# Patient Record
Sex: MALE | Race: WHITE | NOT HISPANIC OR LATINO
[De-identification: names, ages, dates, MRNs, and addresses within clinical notes are randomized per-mention and may not be internally consistent; named-entity substitution may affect disease eponyms.]

---

## 2017-11-29 ENCOUNTER — APPOINTMENT (OUTPATIENT)
Dept: COLORECTAL SURGERY | Facility: CLINIC | Age: 47
End: 2017-11-29
Payer: COMMERCIAL

## 2017-11-29 VITALS — HEIGHT: 78 IN | HEART RATE: 78 BPM | RESPIRATION RATE: 14 BRPM | WEIGHT: 262 LBS | BODY MASS INDEX: 30.31 KG/M2

## 2017-11-29 DIAGNOSIS — Z87.19 PERSONAL HISTORY OF OTHER DISEASES OF THE DIGESTIVE SYSTEM: ICD-10-CM

## 2017-11-29 DIAGNOSIS — K62.89 OTHER SPECIFIED DISEASES OF ANUS AND RECTUM: ICD-10-CM

## 2017-11-29 DIAGNOSIS — Z87.09 PERSONAL HISTORY OF OTHER DISEASES OF THE RESPIRATORY SYSTEM: ICD-10-CM

## 2017-11-29 DIAGNOSIS — L29.0 PRURITUS ANI: ICD-10-CM

## 2017-11-29 PROCEDURE — 46940 TREATMENT OF ANAL FISSURE: CPT

## 2017-11-29 PROCEDURE — 99214 OFFICE O/P EST MOD 30 MIN: CPT | Mod: 25

## 2017-12-05 PROBLEM — Z87.19 HISTORY OF RECTAL BLEEDING: Status: RESOLVED | Noted: 2017-12-05 | Resolved: 2017-12-05

## 2018-02-15 ENCOUNTER — APPOINTMENT (OUTPATIENT)
Dept: COLORECTAL SURGERY | Facility: CLINIC | Age: 48
End: 2018-02-15
Payer: COMMERCIAL

## 2018-02-15 ENCOUNTER — RESULT REVIEW (OUTPATIENT)
Age: 48
End: 2018-02-15

## 2018-02-15 PROCEDURE — 45380 COLONOSCOPY AND BIOPSY: CPT

## 2018-07-11 ENCOUNTER — APPOINTMENT (OUTPATIENT)
Dept: COLORECTAL SURGERY | Facility: CLINIC | Age: 48
End: 2018-07-11
Payer: COMMERCIAL

## 2018-07-11 VITALS
BODY MASS INDEX: 30.08 KG/M2 | HEART RATE: 71 BPM | RESPIRATION RATE: 14 BRPM | WEIGHT: 260 LBS | DIASTOLIC BLOOD PRESSURE: 86 MMHG | SYSTOLIC BLOOD PRESSURE: 123 MMHG | HEIGHT: 78 IN

## 2018-07-11 DIAGNOSIS — E78.5 HYPERLIPIDEMIA, UNSPECIFIED: ICD-10-CM

## 2018-07-11 PROCEDURE — 46942 TREATMENT OF ANAL FISSURE: CPT

## 2018-07-11 PROCEDURE — 99214 OFFICE O/P EST MOD 30 MIN: CPT | Mod: 25

## 2018-08-15 ENCOUNTER — APPOINTMENT (OUTPATIENT)
Dept: COLORECTAL SURGERY | Facility: CLINIC | Age: 48
End: 2018-08-15

## 2018-10-31 ENCOUNTER — APPOINTMENT (OUTPATIENT)
Dept: COLORECTAL SURGERY | Facility: CLINIC | Age: 48
End: 2018-10-31
Payer: COMMERCIAL

## 2018-10-31 VITALS
BODY MASS INDEX: 30.08 KG/M2 | SYSTOLIC BLOOD PRESSURE: 114 MMHG | DIASTOLIC BLOOD PRESSURE: 79 MMHG | WEIGHT: 260 LBS | HEIGHT: 78 IN | HEART RATE: 88 BPM

## 2018-10-31 DIAGNOSIS — Z86.39 PERSONAL HISTORY OF OTHER ENDOCRINE, NUTRITIONAL AND METABOLIC DISEASE: ICD-10-CM

## 2018-10-31 PROCEDURE — 46942 TREATMENT OF ANAL FISSURE: CPT

## 2018-10-31 PROCEDURE — 99214 OFFICE O/P EST MOD 30 MIN: CPT | Mod: 25

## 2018-10-31 RX ORDER — ROSUVASTATIN CALCIUM 5 MG/1
TABLET, FILM COATED ORAL
Refills: 0 | Status: ACTIVE | COMMUNITY

## 2019-02-08 ENCOUNTER — APPOINTMENT (OUTPATIENT)
Dept: COLORECTAL SURGERY | Facility: CLINIC | Age: 49
End: 2019-02-08

## 2020-02-05 ENCOUNTER — APPOINTMENT (OUTPATIENT)
Dept: COLORECTAL SURGERY | Facility: CLINIC | Age: 50
End: 2020-02-05
Payer: COMMERCIAL

## 2020-02-05 VITALS
SYSTOLIC BLOOD PRESSURE: 108 MMHG | HEIGHT: 78 IN | RESPIRATION RATE: 14 BRPM | BODY MASS INDEX: 30.66 KG/M2 | HEART RATE: 71 BPM | WEIGHT: 265 LBS | DIASTOLIC BLOOD PRESSURE: 76 MMHG

## 2020-02-05 DIAGNOSIS — Z86.010 PERSONAL HISTORY OF COLONIC POLYPS: ICD-10-CM

## 2020-02-05 PROCEDURE — 46942 TREATMENT OF ANAL FISSURE: CPT

## 2020-02-05 PROCEDURE — 99215 OFFICE O/P EST HI 40 MIN: CPT | Mod: 25

## 2020-02-05 NOTE — ASSESSMENT
[FreeTextEntry1] : 49-year-old male with anal fissure rectal bleeding And history of colon polyps. Recommend cauterization of anal fissure.Recommend high fiber diet, Metamucil daily, sitz baths, stool softeners, pain medications p.r.n. Recommend colonoscopy. Risks and benefits of colonoscopy have been discussed which include but not limited to bleeding, perforation, missing a cancer or polyp occurring 5%.\par

## 2020-02-05 NOTE — PROCEDURE
[FreeTextEntry1] : After informed consent was obtained anal fissure was cauterized with silver nitrate sphincter was dilated enough to be performed demonstrating hemorrhoids and fissure

## 2020-02-05 NOTE — REVIEW OF SYSTEMS
[Abdominal Pain] : no abdominal pain [Constipation] : constipation [Negative] : Heme/Lymph [FreeTextEntry7] : Anal pain, rectal bleeding

## 2020-02-05 NOTE — PHYSICAL EXAM
[Abdomen Masses] : No abdominal masses [Tender] : nontender [Normal rectal exam] : exam was normal [Excoriation] : no perianal excoriation [Reduce Spontaneously] : a spontaneously reducible (grade II) [Tender, Swollen] : tender, swollen [Thrombosed] : that was thrombosed [Normal] : was normal [Posterior] : posteriorly [None] : there was no rectal mass  [Normal Breath Sounds] : Normal breath sounds [Normal Rate and Rhythm] : normal rate and rhythm [Normal Heart Sounds] : normal heart sounds [No Rash or Lesion] : No rash or lesion [Alert] : alert [Oriented to Person] : oriented to person [Oriented to Place] : oriented to place [Oriented to Time] : oriented to time [Calm] : calm [de-identified] : Looks well in no distress, of stated age. [de-identified] : moves all 4 extremities appropriately with 5 over 5 strength [de-identified] : pupils equal reactive to light normocephalic atraumatic.

## 2020-06-26 ENCOUNTER — TRANSCRIPTION ENCOUNTER (OUTPATIENT)
Age: 50
End: 2020-06-26

## 2020-06-26 ENCOUNTER — OUTPATIENT (OUTPATIENT)
Dept: OUTPATIENT SERVICES | Facility: HOSPITAL | Age: 50
LOS: 1 days | End: 2020-06-26
Payer: COMMERCIAL

## 2020-06-26 ENCOUNTER — APPOINTMENT (OUTPATIENT)
Dept: RADIOLOGY | Facility: CLINIC | Age: 50
End: 2020-06-26
Payer: COMMERCIAL

## 2020-06-26 ENCOUNTER — APPOINTMENT (OUTPATIENT)
Dept: NEUROSURGERY | Facility: CLINIC | Age: 50
End: 2020-06-26
Payer: COMMERCIAL

## 2020-06-26 VITALS
HEART RATE: 81 BPM | TEMPERATURE: 97.5 F | BODY MASS INDEX: 30.66 KG/M2 | SYSTOLIC BLOOD PRESSURE: 115 MMHG | OXYGEN SATURATION: 96 % | DIASTOLIC BLOOD PRESSURE: 82 MMHG | HEIGHT: 78 IN | WEIGHT: 265 LBS

## 2020-06-26 DIAGNOSIS — Z00.8 ENCOUNTER FOR OTHER GENERAL EXAMINATION: ICD-10-CM

## 2020-06-26 DIAGNOSIS — M54.5 LOW BACK PAIN: ICD-10-CM

## 2020-06-26 DIAGNOSIS — M54.10 RADICULOPATHY, SITE UNSPECIFIED: ICD-10-CM

## 2020-06-26 DIAGNOSIS — R20.2 PARESTHESIA OF SKIN: ICD-10-CM

## 2020-06-26 PROCEDURE — 72110 X-RAY EXAM L-2 SPINE 4/>VWS: CPT

## 2020-06-26 PROCEDURE — 99202 OFFICE O/P NEW SF 15 MIN: CPT

## 2020-06-26 PROCEDURE — 72110 X-RAY EXAM L-2 SPINE 4/>VWS: CPT | Mod: 26

## 2020-06-26 RX ORDER — CYCLOBENZAPRINE HYDROCHLORIDE 5 MG/1
5 TABLET, FILM COATED ORAL 3 TIMES DAILY
Qty: 30 | Refills: 0 | Status: ACTIVE | COMMUNITY
Start: 2020-06-26 | End: 1900-01-01

## 2020-06-26 NOTE — CONSULT LETTER
[Dear  ___] : Dear  [unfilled], [Sincerely,] : Sincerely, [Courtesy Letter:] : I had the pleasure of seeing your patient, [unfilled], in my office today. [FreeTextEntry1] : Da Yo is a 49-year-old male, with a history of vascular insufficiency with ablations, who presents today with lumbar symptoms.  Patient states symptoms started approximately 1 week ago with no specific event.  He reports 6/10 constant dull ache lower back pain.  Bending forward produces sharp pain.  Initially, he was experiencing left side greater than right bilateral leg tightness.  This has progressed to a heaviness-like sensation and is most predominant in the left entire leg.  He denies any specific numbness or tingling.  He reports slight left leg weakness.  He denies any catching of his toes or walking difficulties.  He denies any bowel or bladder dysfunction.  Patient states standing, sitting, and bending over increased pain.  Lying down and extension of back provide him with some relief.  Patient takes Tylenol and Advil with some relief noted.  Heat provides him with some relief.\par \par There is no imaging to review with the patient. \par \par Patient is alert and oriented.  No distress noted.  Absent bilateral patellar reflexes noted.  Patient appears tense.  Present and equal bilateral Achilles tendon reflexes noted.  5/5 strength to bilateral legs.  Sensation to light touch to bilateral legs equal and normal.  Negative clonus.  Positive left leg straight test.  Patient is walking without difficulties.\par \par I have provided the patient with a prescription for Flexeril.  I reviewed side effects of the medication with the patient.  I provided him with a prescription for physical therapy.  I have also provided the patient with a prescription for an x-ray of the lumbar spine.  We will review Xray results over the phone. I have recommended FU with his vascular MD for the bilateral leg heaviness. We will follow-up approximately 6 weeks after initiating physical therapy.  Is aware to call with any new or worsening symptoms. [FreeTextEntry2] : Daniel Hargrove MD\par [address] [place] [address] Ave\[place] [address] [FreeTextEntry3] : Mira Lopez, MSN, FNP-BC\par Nurse Practitioner\par Neurosurgery\par [address] [facility], 2nd floor \[place] [address] \par Office: [phone] \par Fax: [phone]\par  \par tt

## 2020-08-13 DIAGNOSIS — Z01.818 ENCOUNTER FOR OTHER PREPROCEDURAL EXAMINATION: ICD-10-CM

## 2020-08-17 ENCOUNTER — APPOINTMENT (OUTPATIENT)
Dept: DISASTER EMERGENCY | Facility: CLINIC | Age: 50
End: 2020-08-17

## 2020-08-19 LAB — SARS-COV-2 N GENE NPH QL NAA+PROBE: NOT DETECTED

## 2020-08-20 ENCOUNTER — APPOINTMENT (OUTPATIENT)
Dept: COLORECTAL SURGERY | Facility: AMBULATORY MEDICAL SERVICES | Age: 50
End: 2020-08-20
Payer: COMMERCIAL

## 2020-08-20 ENCOUNTER — RESULT REVIEW (OUTPATIENT)
Age: 50
End: 2020-08-20

## 2020-08-20 PROCEDURE — 45380 COLONOSCOPY AND BIOPSY: CPT

## 2020-10-01 PROBLEM — M54.10 BILATERAL RADIATING LEG PAIN: Status: ACTIVE | Noted: 2020-06-26

## 2021-12-21 ENCOUNTER — APPOINTMENT (OUTPATIENT)
Dept: COLORECTAL SURGERY | Facility: CLINIC | Age: 51
End: 2021-12-21
Payer: COMMERCIAL

## 2021-12-21 VITALS
TEMPERATURE: 96.6 F | SYSTOLIC BLOOD PRESSURE: 125 MMHG | BODY MASS INDEX: 30.66 KG/M2 | HEART RATE: 84 BPM | WEIGHT: 265 LBS | HEIGHT: 78 IN | RESPIRATION RATE: 16 BRPM | DIASTOLIC BLOOD PRESSURE: 89 MMHG

## 2021-12-21 PROCEDURE — 46942 TREATMENT OF ANAL FISSURE: CPT

## 2021-12-21 PROCEDURE — 99214 OFFICE O/P EST MOD 30 MIN: CPT | Mod: 25

## 2021-12-21 RX ORDER — HYDROCORTISONE 25 MG/G
2.5 CREAM TOPICAL
Qty: 1 | Refills: 3 | Status: ACTIVE | COMMUNITY
Start: 2021-12-21 | End: 1900-01-01

## 2021-12-21 NOTE — PHYSICAL EXAM
[Abdomen Masses] : No abdominal masses [Tender] : nontender [Normal rectal exam] : exam was normal [Excoriation] : no perianal excoriation [Reduce Spontaneously] : a spontaneously reducible (grade II) [Tender, Swollen] : tender, swollen [Thrombosed] : that was thrombosed [Normal] : was normal [Posterior] : posteriorly [None] : there was no rectal mass  [Normal Breath Sounds] : Normal breath sounds [Normal Heart Sounds] : normal heart sounds [Normal Rate and Rhythm] : normal rate and rhythm [No Rash or Lesion] : No rash or lesion [Alert] : alert [Oriented to Person] : oriented to person [Oriented to Place] : oriented to place [Oriented to Time] : oriented to time [Calm] : calm [de-identified] : Looks well in no distress, of stated age. [de-identified] : pupils equal reactive to light normocephalic atraumatic. [de-identified] : moves all 4 extremities appropriately with 5 over 5 strength

## 2021-12-21 NOTE — ASSESSMENT
[FreeTextEntry1] : 51-year-old male with anal fissure rectal bleeding . Recommend cauterization of anal fissure.Recommend high fiber diet, Metamucil daily, sitz baths, stool softeners, pain medications p.r.n. hydrocortisone cream\par

## 2022-02-01 ENCOUNTER — APPOINTMENT (OUTPATIENT)
Dept: COLORECTAL SURGERY | Facility: CLINIC | Age: 52
End: 2022-02-01
Payer: COMMERCIAL

## 2022-02-01 VITALS
WEIGHT: 265 LBS | DIASTOLIC BLOOD PRESSURE: 76 MMHG | TEMPERATURE: 97.3 F | HEART RATE: 84 BPM | BODY MASS INDEX: 30.62 KG/M2 | SYSTOLIC BLOOD PRESSURE: 120 MMHG

## 2022-02-01 VITALS — HEIGHT: 78 IN | RESPIRATION RATE: 16 BRPM | BODY MASS INDEX: 30.62 KG/M2

## 2022-02-01 PROCEDURE — 46221 LIGATION OF HEMORRHOID(S): CPT

## 2022-02-01 PROCEDURE — 99214 OFFICE O/P EST MOD 30 MIN: CPT | Mod: 25

## 2022-02-02 NOTE — HISTORY OF PRESENT ILLNESS
[FreeTextEntry1] : 51-year-old male with less complaints With long-standing history of prolapsing bleeding internal and external hemorrhoids. Symptoms have been present for many years and worsening. Bleeding as bright red with bowel movements. Has occasional pain.

## 2022-02-02 NOTE — PHYSICAL EXAM
[Abdomen Masses] : No abdominal masses [Tender] : nontender [Normal rectal exam] : exam was normal [Excoriation] : no perianal excoriation [Manually Reducible] : a manually reducible (grade III) [Tender, Swollen] : tender, swollen [Thrombosed] : that was not thrombosed [Normal] : was normal [Posterior] : posteriorly [None] : there was no rectal mass  [Normal Breath Sounds] : Normal breath sounds [Normal Heart Sounds] : normal heart sounds [Normal Rate and Rhythm] : normal rate and rhythm [No Rash or Lesion] : No rash or lesion [Alert] : alert [Oriented to Person] : oriented to person [Oriented to Place] : oriented to place [Oriented to Time] : oriented to time [Calm] : calm [de-identified] : Looks well in no distress, of stated age. [de-identified] : pupils equal reactive to light normocephalic atraumatic. [de-identified] : moves all 4 extremities appropriately with 5 over 5 strength

## 2022-08-16 ENCOUNTER — APPOINTMENT (OUTPATIENT)
Dept: COLORECTAL SURGERY | Facility: CLINIC | Age: 52
End: 2022-08-16

## 2022-08-16 PROCEDURE — 46221 LIGATION OF HEMORRHOID(S): CPT

## 2022-08-16 PROCEDURE — 99214 OFFICE O/P EST MOD 30 MIN: CPT | Mod: 25

## 2022-08-18 NOTE — ASSESSMENT
[FreeTextEntry1] : 52-year-old male with Bleeding hemorrhoids Recommend Rubber band ligation procedure.Recommend high fiber diet, Metamucil daily, sitz baths, stool softeners, pain medications p.r.n. hydrocortisone cream\par

## 2022-08-18 NOTE — HISTORY OF PRESENT ILLNESS
[FreeTextEntry1] : 52-year-old male with less complaints With long-standing history of prolapsing bleeding internal and external hemorrhoids. Symptoms have been present for many years and worsening. Bleeding as bright red with bowel movements. Has occasional pain.

## 2022-08-18 NOTE — PHYSICAL EXAM
[Abdomen Masses] : No abdominal masses [Tender] : nontender [Normal rectal exam] : exam was normal [Excoriation] : no perianal excoriation [Manually Reducible] : a manually reducible (grade III) [Tender, Swollen] : tender, swollen [Thrombosed] : that was not thrombosed [Normal] : was normal [Posterior] : posteriorly [None] : there was no rectal mass  [Normal Breath Sounds] : Normal breath sounds [Normal Heart Sounds] : normal heart sounds [Normal Rate and Rhythm] : normal rate and rhythm [No Rash or Lesion] : No rash or lesion [Alert] : alert [Oriented to Person] : oriented to person [Oriented to Place] : oriented to place [Oriented to Time] : oriented to time [Calm] : calm [de-identified] : Looks well in no distress, of stated age. [de-identified] : pupils equal reactive to light normocephalic atraumatic. [de-identified] : moves all 4 extremities appropriately with 5 over 5 strength

## 2022-11-15 ENCOUNTER — APPOINTMENT (OUTPATIENT)
Dept: COLORECTAL SURGERY | Facility: CLINIC | Age: 52
End: 2022-11-15

## 2022-11-15 PROCEDURE — 99214 OFFICE O/P EST MOD 30 MIN: CPT | Mod: 25

## 2022-11-15 PROCEDURE — 46221 LIGATION OF HEMORRHOID(S): CPT

## 2022-11-16 NOTE — PHYSICAL EXAM
[Abdomen Masses] : No abdominal masses [Tender] : nontender [Normal rectal exam] : exam was normal [Excoriation] : no perianal excoriation [Manually Reducible] : a manually reducible (grade III) [Tender, Swollen] : tender, swollen [Thrombosed] : that was not thrombosed [Normal] : was normal [Posterior] : posteriorly [None] : there was no rectal mass  [Normal Breath Sounds] : Normal breath sounds [Normal Heart Sounds] : normal heart sounds [Normal Rate and Rhythm] : normal rate and rhythm [No Rash or Lesion] : No rash or lesion [Alert] : alert [Oriented to Person] : oriented to person [Oriented to Place] : oriented to place [Oriented to Time] : oriented to time [Calm] : calm [de-identified] : Looks well in no distress, of stated age. [de-identified] : pupils equal reactive to light normocephalic atraumatic. [de-identified] : moves all 4 extremities appropriately with 5 over 5 strength

## 2023-03-21 ENCOUNTER — APPOINTMENT (OUTPATIENT)
Dept: COLORECTAL SURGERY | Facility: CLINIC | Age: 53
End: 2023-03-21
Payer: COMMERCIAL

## 2023-03-21 VITALS
SYSTOLIC BLOOD PRESSURE: 131 MMHG | HEART RATE: 87 BPM | BODY MASS INDEX: 31.24 KG/M2 | RESPIRATION RATE: 16 BRPM | WEIGHT: 270 LBS | HEIGHT: 78 IN | DIASTOLIC BLOOD PRESSURE: 89 MMHG

## 2023-03-21 PROCEDURE — 99214 OFFICE O/P EST MOD 30 MIN: CPT | Mod: 25

## 2023-03-21 PROCEDURE — 46221 LIGATION OF HEMORRHOID(S): CPT

## 2023-03-21 NOTE — PHYSICAL EXAM
[Abdomen Masses] : No abdominal masses [Tender] : nontender [Normal rectal exam] : exam was normal [Excoriation] : no perianal excoriation [Manually Reducible] : a manually reducible (grade III) [Tender, Swollen] : tender, swollen [Thrombosed] : that was not thrombosed [Normal] : was normal [Posterior] : posteriorly [None] : there was no rectal mass  [Normal Breath Sounds] : Normal breath sounds [Normal Heart Sounds] : normal heart sounds [Normal Rate and Rhythm] : normal rate and rhythm [No Rash or Lesion] : No rash or lesion [Alert] : alert [Oriented to Person] : oriented to person [Oriented to Place] : oriented to place [Oriented to Time] : oriented to time [Calm] : calm [de-identified] : Looks well in no distress, of stated age. [de-identified] : pupils equal reactive to light normocephalic atraumatic. [de-identified] : moves all 4 extremities appropriately with 5 over 5 strength

## 2023-03-21 NOTE — HISTORY OF PRESENT ILLNESS
[FreeTextEntry1] : 52-year-old male with minimal complaints of hemorrhoids has improved significantly denies any pain or bleeding

## 2024-04-15 ENCOUNTER — NON-APPOINTMENT (OUTPATIENT)
Age: 54
End: 2024-04-15

## 2024-04-16 ENCOUNTER — APPOINTMENT (OUTPATIENT)
Dept: COLORECTAL SURGERY | Facility: CLINIC | Age: 54
End: 2024-04-16
Payer: COMMERCIAL

## 2024-04-16 PROCEDURE — 46942 TREATMENT OF ANAL FISSURE: CPT

## 2024-04-16 PROCEDURE — 99214 OFFICE O/P EST MOD 30 MIN: CPT | Mod: 25

## 2024-04-18 NOTE — HISTORY OF PRESENT ILLNESS
[FreeTextEntry1] : 53-year-old male with less complaints With long-standing history of prolapsing bleeding internal and external hemorrhoids and fissures. Symptoms have been present for many years and worsening. Bleeding as bright red with bowel movements. Has occasional pain.

## 2024-04-18 NOTE — ASSESSMENT
[FreeTextEntry1] : 53 year-old male with anal fissures and bleeding hemorrhoids Recommend cauterization of anal fissure, hydrocortisone cream, recommend high fiber diet, Metamucil daily, sitz baths, stool softeners, pain medications p.r.n.

## 2024-04-18 NOTE — PHYSICAL EXAM
[Abdomen Masses] : No abdominal masses [Tender] : nontender [Normal rectal exam] : exam was normal [Excoriation] : no perianal excoriation [Manually Reducible] : a manually reducible (grade III) [Tender, Swollen] : tender, swollen [Thrombosed] : that was not thrombosed [Normal] : was normal [Posterior] : posteriorly [None] : there was no rectal mass  [Normal Breath Sounds] : Normal breath sounds [Normal Heart Sounds] : normal heart sounds [Normal Rate and Rhythm] : normal rate and rhythm [No Rash or Lesion] : No rash or lesion [Alert] : alert [Oriented to Person] : oriented to person [Oriented to Place] : oriented to place [Oriented to Time] : oriented to time [Calm] : calm [de-identified] : Looks well in no distress, of stated age. [de-identified] : pupils equal reactive to light normocephalic atraumatic. [de-identified] : moves all 4 extremities appropriately with 5 over 5 strength

## 2024-05-28 ENCOUNTER — APPOINTMENT (OUTPATIENT)
Dept: COLORECTAL SURGERY | Facility: CLINIC | Age: 54
End: 2024-05-28
Payer: COMMERCIAL

## 2024-05-28 VITALS
WEIGHT: 273 LBS | SYSTOLIC BLOOD PRESSURE: 111 MMHG | BODY MASS INDEX: 31.59 KG/M2 | RESPIRATION RATE: 16 BRPM | DIASTOLIC BLOOD PRESSURE: 79 MMHG | HEIGHT: 78 IN | HEART RATE: 81 BPM

## 2024-05-28 DIAGNOSIS — K64.8 PERIANAL VENOUS THROMBOSIS: ICD-10-CM

## 2024-05-28 DIAGNOSIS — K60.2 ANAL FISSURE, UNSPECIFIED: ICD-10-CM

## 2024-05-28 DIAGNOSIS — K64.5 PERIANAL VENOUS THROMBOSIS: ICD-10-CM

## 2024-05-28 PROCEDURE — 46942 TREATMENT OF ANAL FISSURE: CPT

## 2024-05-28 PROCEDURE — 99214 OFFICE O/P EST MOD 30 MIN: CPT | Mod: 25

## 2024-05-28 NOTE — PHYSICAL EXAM
[Abdomen Masses] : No abdominal masses [Tender] : nontender [Normal rectal exam] : exam was normal [Excoriation] : no perianal excoriation [Manually Reducible] : a manually reducible (grade III) [Tender, Swollen] : tender, swollen [Thrombosed] : that was not thrombosed [Normal] : was normal [Posterior] : posteriorly [None] : there was no rectal mass  [Normal Breath Sounds] : Normal breath sounds [Normal Heart Sounds] : normal heart sounds [Normal Rate and Rhythm] : normal rate and rhythm [No Rash or Lesion] : No rash or lesion [Alert] : alert [Oriented to Person] : oriented to person [Oriented to Place] : oriented to place [Oriented to Time] : oriented to time [Calm] : calm [de-identified] : Looks well in no distress, of stated age. [de-identified] : pupils equal reactive to light normocephalic atraumatic. [de-identified] : moves all 4 extremities appropriately with 5 over 5 strength

## 2024-07-20 NOTE — HISTORY OF PRESENT ILLNESS
[FreeTextEntry1] : 49-year-old male with less complaints of anorectal pain and bleeding. Symptoms improved. . Bleeding bright red with bowel movements is less.Feels that the fissure has come back. Also a history of colon polyps in need of surveillance colonoscopy
Pupils equal, round and reactive to light, Extra-ocular movement intact, eyes are clear b/l

## 2024-08-27 ENCOUNTER — APPOINTMENT (OUTPATIENT)
Dept: COLORECTAL SURGERY | Facility: CLINIC | Age: 54
End: 2024-08-27

## 2024-11-08 ENCOUNTER — APPOINTMENT (OUTPATIENT)
Dept: COLORECTAL SURGERY | Facility: CLINIC | Age: 54
End: 2024-11-08
Payer: COMMERCIAL

## 2024-11-08 VITALS
DIASTOLIC BLOOD PRESSURE: 94 MMHG | BODY MASS INDEX: 37.11 KG/M2 | HEART RATE: 86 BPM | HEIGHT: 73 IN | RESPIRATION RATE: 16 BRPM | OXYGEN SATURATION: 96 % | WEIGHT: 280 LBS | SYSTOLIC BLOOD PRESSURE: 131 MMHG

## 2024-11-08 DIAGNOSIS — K60.2 ANAL FISSURE, UNSPECIFIED: ICD-10-CM

## 2024-11-08 PROCEDURE — 46942 TREATMENT OF ANAL FISSURE: CPT

## 2024-11-08 PROCEDURE — 99214 OFFICE O/P EST MOD 30 MIN: CPT | Mod: 25

## 2024-12-24 ENCOUNTER — APPOINTMENT (OUTPATIENT)
Dept: COLORECTAL SURGERY | Facility: CLINIC | Age: 54
End: 2024-12-24
Payer: COMMERCIAL

## 2024-12-24 VITALS
DIASTOLIC BLOOD PRESSURE: 93 MMHG | BODY MASS INDEX: 36.45 KG/M2 | HEIGHT: 73 IN | WEIGHT: 275 LBS | HEART RATE: 82 BPM | SYSTOLIC BLOOD PRESSURE: 132 MMHG | RESPIRATION RATE: 16 BRPM

## 2024-12-24 DIAGNOSIS — K64.8 PERIANAL VENOUS THROMBOSIS: ICD-10-CM

## 2024-12-24 DIAGNOSIS — K64.5 PERIANAL VENOUS THROMBOSIS: ICD-10-CM

## 2024-12-24 PROCEDURE — 99214 OFFICE O/P EST MOD 30 MIN: CPT | Mod: 25

## 2024-12-24 PROCEDURE — 46942 TREATMENT OF ANAL FISSURE: CPT

## 2025-02-20 ENCOUNTER — APPOINTMENT (OUTPATIENT)
Dept: COLORECTAL SURGERY | Facility: AMBULATORY MEDICAL SERVICES | Age: 55
End: 2025-02-20

## 2025-02-20 PROCEDURE — 45378 DIAGNOSTIC COLONOSCOPY: CPT

## 2025-03-18 ENCOUNTER — APPOINTMENT (OUTPATIENT)
Dept: COLORECTAL SURGERY | Facility: CLINIC | Age: 55
End: 2025-03-18
Payer: COMMERCIAL

## 2025-03-18 VITALS
WEIGHT: 280 LBS | HEIGHT: 73 IN | SYSTOLIC BLOOD PRESSURE: 121 MMHG | HEART RATE: 120 BPM | RESPIRATION RATE: 16 BRPM | BODY MASS INDEX: 37.11 KG/M2 | DIASTOLIC BLOOD PRESSURE: 86 MMHG

## 2025-03-18 DIAGNOSIS — K60.2 ANAL FISSURE, UNSPECIFIED: ICD-10-CM

## 2025-03-18 PROCEDURE — 46942 TREATMENT OF ANAL FISSURE: CPT

## 2025-03-18 PROCEDURE — 99214 OFFICE O/P EST MOD 30 MIN: CPT | Mod: 25

## 2025-03-18 RX ORDER — FLUOCINOLONE ACETONIDE 0.25 MG/G
0.03 CREAM TOPICAL TWICE DAILY
Qty: 1 | Refills: 3 | Status: ACTIVE | COMMUNITY
Start: 2025-03-18 | End: 1900-01-01

## 2025-06-19 ENCOUNTER — OUTPATIENT (OUTPATIENT)
Dept: OUTPATIENT SERVICES | Facility: HOSPITAL | Age: 55
LOS: 1 days | End: 2025-06-19
Payer: COMMERCIAL

## 2025-06-19 VITALS
TEMPERATURE: 99 F | OXYGEN SATURATION: 100 % | SYSTOLIC BLOOD PRESSURE: 124 MMHG | DIASTOLIC BLOOD PRESSURE: 87 MMHG | RESPIRATION RATE: 18 BRPM | WEIGHT: 278.66 LBS | HEART RATE: 74 BPM | HEIGHT: 73 IN

## 2025-06-19 DIAGNOSIS — Z98.890 OTHER SPECIFIED POSTPROCEDURAL STATES: Chronic | ICD-10-CM

## 2025-06-19 DIAGNOSIS — Z01.818 ENCOUNTER FOR OTHER PREPROCEDURAL EXAMINATION: ICD-10-CM

## 2025-06-19 LAB
A1C WITH ESTIMATED AVERAGE GLUCOSE RESULT: 5.4 % — SIGNIFICANT CHANGE UP (ref 4–5.6)
ALBUMIN SERPL ELPH-MCNC: 4.4 G/DL — SIGNIFICANT CHANGE UP (ref 3.3–5)
ANION GAP SERPL CALC-SCNC: 6 MMOL/L — SIGNIFICANT CHANGE UP (ref 5–17)
APTT BLD: 31.5 SEC — SIGNIFICANT CHANGE UP (ref 26.1–36.8)
BASOPHILS # BLD AUTO: 0.02 K/UL — SIGNIFICANT CHANGE UP (ref 0–0.2)
BASOPHILS NFR BLD AUTO: 0.4 % — SIGNIFICANT CHANGE UP (ref 0–2)
BLD GP AB SCN SERPL QL: SIGNIFICANT CHANGE UP
BUN SERPL-MCNC: 14 MG/DL — SIGNIFICANT CHANGE UP (ref 7–23)
CALCIUM SERPL-MCNC: 9.4 MG/DL — SIGNIFICANT CHANGE UP (ref 8.5–10.1)
CHLORIDE SERPL-SCNC: 103 MMOL/L — SIGNIFICANT CHANGE UP (ref 96–108)
CO2 SERPL-SCNC: 28 MMOL/L — SIGNIFICANT CHANGE UP (ref 22–31)
COHGB MFR BLDV: 1.6 % — SIGNIFICANT CHANGE UP
CREAT SERPL-MCNC: 0.85 MG/DL — SIGNIFICANT CHANGE UP (ref 0.5–1.3)
EGFR: 105 ML/MIN/1.73M2 — SIGNIFICANT CHANGE UP
EGFR: 105 ML/MIN/1.73M2 — SIGNIFICANT CHANGE UP
EOSINOPHIL # BLD AUTO: 0.13 K/UL — SIGNIFICANT CHANGE UP (ref 0–0.5)
EOSINOPHIL NFR BLD AUTO: 2.3 % — SIGNIFICANT CHANGE UP (ref 0–6)
ESTIMATED AVERAGE GLUCOSE: 108 MG/DL — SIGNIFICANT CHANGE UP (ref 68–114)
GLUCOSE SERPL-MCNC: 94 MG/DL — SIGNIFICANT CHANGE UP (ref 70–99)
HCT VFR BLD CALC: 46.4 % — SIGNIFICANT CHANGE UP (ref 39–50)
HGB BLD-MCNC: 15.6 G/DL — SIGNIFICANT CHANGE UP (ref 13–17)
IMM GRANULOCYTES # BLD AUTO: 0.01 K/UL — SIGNIFICANT CHANGE UP (ref 0–0.07)
IMM GRANULOCYTES NFR BLD AUTO: 0.2 % — SIGNIFICANT CHANGE UP (ref 0–0.9)
INR BLD: 1.04 RATIO — SIGNIFICANT CHANGE UP (ref 0.85–1.16)
LYMPHOCYTES # BLD AUTO: 1.85 K/UL — SIGNIFICANT CHANGE UP (ref 1–3.3)
LYMPHOCYTES NFR BLD AUTO: 33.1 % — SIGNIFICANT CHANGE UP (ref 13–44)
MCHC RBC-ENTMCNC: 30.4 PG — SIGNIFICANT CHANGE UP (ref 27–34)
MCHC RBC-ENTMCNC: 33.6 G/DL — SIGNIFICANT CHANGE UP (ref 32–36)
MCV RBC AUTO: 90.4 FL — SIGNIFICANT CHANGE UP (ref 80–100)
MONOCYTES # BLD AUTO: 0.68 K/UL — SIGNIFICANT CHANGE UP (ref 0–0.9)
MONOCYTES NFR BLD AUTO: 12.2 % — SIGNIFICANT CHANGE UP (ref 2–14)
NEUTROPHILS # BLD AUTO: 2.9 K/UL — SIGNIFICANT CHANGE UP (ref 1.8–7.4)
NEUTROPHILS NFR BLD AUTO: 51.8 % — SIGNIFICANT CHANGE UP (ref 43–77)
NRBC # BLD AUTO: 0 K/UL — SIGNIFICANT CHANGE UP (ref 0–0)
NRBC # FLD: 0 K/UL — SIGNIFICANT CHANGE UP (ref 0–0)
NRBC BLD AUTO-RTO: 0 /100 WBCS — SIGNIFICANT CHANGE UP (ref 0–0)
PLATELET # BLD AUTO: 174 K/UL — SIGNIFICANT CHANGE UP (ref 150–400)
PMV BLD: 9.3 FL — SIGNIFICANT CHANGE UP (ref 7–13)
POTASSIUM SERPL-MCNC: 4 MMOL/L — SIGNIFICANT CHANGE UP (ref 3.5–5.3)
POTASSIUM SERPL-SCNC: 4 MMOL/L — SIGNIFICANT CHANGE UP (ref 3.5–5.3)
PROTHROM AB SERPL-ACNC: 11.9 SEC — SIGNIFICANT CHANGE UP (ref 9.9–13.4)
RBC # BLD: 5.13 M/UL — SIGNIFICANT CHANGE UP (ref 4.2–5.8)
RBC # FLD: 13.2 % — SIGNIFICANT CHANGE UP (ref 10.3–14.5)
SODIUM SERPL-SCNC: 137 MMOL/L — SIGNIFICANT CHANGE UP (ref 135–145)
WBC # BLD: 5.59 K/UL — SIGNIFICANT CHANGE UP (ref 3.8–10.5)
WBC # FLD AUTO: 5.59 K/UL — SIGNIFICANT CHANGE UP (ref 3.8–10.5)

## 2025-06-19 PROCEDURE — 86850 RBC ANTIBODY SCREEN: CPT

## 2025-06-19 PROCEDURE — 82375 ASSAY CARBOXYHB QUANT: CPT

## 2025-06-19 PROCEDURE — 86901 BLOOD TYPING SEROLOGIC RH(D): CPT

## 2025-06-19 PROCEDURE — 80048 BASIC METABOLIC PNL TOTAL CA: CPT

## 2025-06-19 PROCEDURE — 85610 PROTHROMBIN TIME: CPT

## 2025-06-19 PROCEDURE — 93005 ELECTROCARDIOGRAM TRACING: CPT

## 2025-06-19 PROCEDURE — 93010 ELECTROCARDIOGRAM REPORT: CPT

## 2025-06-19 PROCEDURE — 83036 HEMOGLOBIN GLYCOSYLATED A1C: CPT

## 2025-06-19 PROCEDURE — 82040 ASSAY OF SERUM ALBUMIN: CPT

## 2025-06-19 PROCEDURE — 85025 COMPLETE CBC W/AUTO DIFF WBC: CPT

## 2025-06-19 PROCEDURE — 86900 BLOOD TYPING SEROLOGIC ABO: CPT

## 2025-06-19 PROCEDURE — 36415 COLL VENOUS BLD VENIPUNCTURE: CPT

## 2025-06-19 PROCEDURE — 99214 OFFICE O/P EST MOD 30 MIN: CPT | Mod: 25

## 2025-06-19 PROCEDURE — 85730 THROMBOPLASTIN TIME PARTIAL: CPT

## 2025-06-19 NOTE — H&P PST ADULT - NSICDXPASTSURGICALHX_GEN_ALL_CORE_FT
PAST SURGICAL HISTORY:  History of endoscopy     History of prior ablation treatment     S/P colonoscopic polypectomy     S/P foot surgery, left     S/P sclerotherapy of varicose veins     S/P vein stripping

## 2025-06-19 NOTE — H&P PST ADULT - NSICDXPASTMEDICALHX_GEN_ALL_CORE_FT
PAST MEDICAL HISTORY:  Anxiety     Asthma     Enlarged aorta     Erectile dysfunction     History of varicose veins     HLD (hyperlipidemia)     Sleep apnea     Venous insufficiency

## 2025-06-19 NOTE — H&P PST ADULT - HISTORY OF PRESENT ILLNESS
53 y/o male with obesity  55 y/o male with obesity, his PMH includes enlarged aorta, venous insufficiency, sleep apnea, asthma, HLD, anxiety. Pt reports he tried diet and exercise for weight loss without success. He is scheduled for Laparoscopic sleeve gastrectomy - robotic, intra operative endoscopy.

## 2025-06-19 NOTE — H&P PST ADULT - NSICDXFAMILYHX_GEN_ALL_CORE_FT
FAMILY HISTORY:  Father  Still living? Unknown  Family history of heart disease, Age at diagnosis: Age Unknown    Mother  Still living? Unknown  Family history of heart disease, Age at diagnosis: Age Unknown    Grandparent  Still living? Unknown  Family history of diabetes mellitus (DM), Age at diagnosis: Age Unknown

## 2025-06-19 NOTE — H&P PST ADULT - ASSESSMENT
Plan  1. Stop all NSAIDS, herbal supplements and vitamins for 7 days.  2. Preop diet and NPO as per surgeon's instructions  3. Take the following medications ( ) with small sips of water on the morning of your procedure/surgery.  4. Use EZ sponges as directed  5. Labs, EKG done in PST   6. PMD visit for optimization prior to surgery as per surgeon.       53 y/o male with obesity, his PMH includes enlarged aorta, venous insufficiency, sleep apnea, asthma, HLD, anxiety. Pt reports he tried diet and exercise for weight loss without success. He is scheduled for Laparoscopic sleeve gastrectomy - robotic, intra operative endoscopy.   Plan  1. Stop all NSAIDS, herbal supplements and vitamins for 7 days.  2. Preop diet and NPO as per surgeon's instructions  3. Take the following medications ( Xanax ) with small sips of water on the morning of your procedure/surgery and use the inhaler if needed.  4. Use EZ sponges as directed  5. Labs, EKG done in PST   6. PMD visit for optimization prior to surgery as per surgeon.    CAPRINI SCORE Version 2013    AGE RELATED RISK FACTORS                                                             [ x ] Age 41-60 years             [1 point]  [ ] Age: 61-74 years            [2 points]                 [ ] Age 75 years or over     [3 points]             DISEASE RELATED RISK FACTORS                                                       [ ] Current swollen legs (pitting edema of any level)     [1 point]                     [ ] Varicose veins (visible, bulging vein, not spider veins or surgically removed veins)   [1 point]                                 [x ] BMI > 25 Kg/m2                       [1 point]  [ ] BMI > 40 kg/m2                       [1 point]                                 [ ] Serious infection (within past month, requiring hospitalization and IV antibiotics)    [1 point]                     [ ] Lung disease ( interstitial: COPD, emphysema, sarcoid, 9-11 illness, NOT asthma)       [1 point]                                                                          [ ] Acute myocardial infarction (within past month)      [1 point]                  [ ] Congestive heart failure (episode within past month or taking CHF meds)                   [1 point]         [ ] Inflammatory bowel disease (Crohns disease or ulcerative colitis, not irritable bowel syndrome)   [1 point]                  [ ] Central venous access, PICC line or Port (within past month)     [2 points]                                                             [ ] Stroke (within past month)     [5 points]    [ ] Previous or present malignancy (includes melanoma but not basal cell carcinoma, each incidence of cancer scores 2 points-not metastases)  [2 points]                                                                                                                                                         HEMATOLOGY RELATED FACTORS                                                         [ ] History of DVT or PE (including superficial venous thrombosis)    [3 points]                    [ ] Positive family history for DVT or PE (includes first-, second-, and third-degree relatives)   [3 points]   [ ] Personal or family history of genetic thrombophilia (family history counts only if it has not been confirmed that patient does not have this genetic marker)                       [ ] Prothrombin 09895O mutation                   [3 points]                           [ ] Factor V Leiden                                               [3 points]            [ ] Antithrombin III deficiency                           [3 points]         [ ] Protein C & S deficiency                                [3 points]              [ ] Dysfibrinogenemia                                         [3 points]  [ ] Personal history of acquired thrombophilia                       [ ] Lupus anticoagulant                                       [3 points]                                                                  [ ] Anticardiolipin antibodies                             [3 points]              [ ] Antiphospholipid antibodies                         [3 points]                                                         [ ] High homocysteine in the blood                  [3 points]                                                    [ ] Myeloproliferative disorders (including thrombocytosis)    [3 points]            [ ] HIV                                                                     [3 points]                                                    [ ] Heparin induced thrombocytopenia            [3 points]                                        MOBILITY RELATED FACTORS  [ ] Bed rest or restricted mobility (inability to ambulate 30 feet continuously or removable leg brace) for less than 72 hours    [1 point]  [ ] Nonremovable plaster cast or mold that prevents calf muscle use   [2 points]  [ ] Bed bound  or restricted mobility for more than 72 hours                  [2 points]    GENDER SPECIFIC FACTORS  [ ] Pregnancy or had a baby within the last month   [1 point]  [ ] Hormone therapy  or oral contraception               [1 point]  [ ] Current use of estrogen-like drugs (raloxifine, tamoxifen, anastrozole, letrozole)                                [1 point]  [ ] History of unexplained stillborn infant, premature birth with toxemia or growth-restricted infant   [1 point]  [ ] Recurrent spontaneous abortions (3 or more)      [1 point]    OTHER RISK FACTORS                                         [ ] Current smoker (includes vaping and smoking marijuana)      [1 point]  [ ] Diabetes requiring insulin     [1 point]                   [ ] Chemotherapy (includes methotrexate for rheumatoid arthritis, hydroxyurea for thrombocytosis)    [1 point]  [ ] Blood transfusion(s)               [1 point]    SURGERY RELATED RISK FACTORS  [ ]  section within the last month                    [1 point]  [ ] Minor surgery is planned (less than 45 minutes)     [1 point]  [ ] Past major surgery (longer than 45 minutes) within past month  [2 points]  x[ ] Planned major surgery lasting more than 45 minutes (includes laparoscopic and arthroscopic; do not add to the "5" for hip and knee replacement)    [2 points]  [ ] Length of surgery over 2 hours (includes anesthesia time; do not add to the "5" for hip and knee replacement)   [1 point]  [ ] Elective hip or knee joint replacement surgery         [5 points]                                               TRAUMA RELATED RISK FACTORS  [ ] Fracture of the hip, pelvis, or leg                       [5 points]  [ ] Spinal cord injury resulting in paralysis (within the past month)    [5 points]  [ ] Paralysis  (within the past month)                      [5 points]  [ ] Multiple trauma (within the past month)         [5 Points]    Total Score [     4   ]    Total hip and total knee replacement:  Caprini score 9 or less: LOW risk  Caprini score 10 or highter: HIGH RISK    Caprini score 0-2: Low Risk, NO VTE prophylaxis required for most patients, encourage ambulation  Caprini score 3-6: Moderate Risk , pharmacologic VTE prophylaxis is indicated for most patients (in the absence of contraindications)  Caprini score 7 or higher: High risk, pharmocologic VTE prophylaxis indicated for most patients (in the absence of contraindications)

## 2025-06-20 DIAGNOSIS — Z01.818 ENCOUNTER FOR OTHER PREPROCEDURAL EXAMINATION: ICD-10-CM

## 2025-06-24 ENCOUNTER — APPOINTMENT (OUTPATIENT)
Dept: COLORECTAL SURGERY | Facility: CLINIC | Age: 55
End: 2025-06-24
Payer: COMMERCIAL

## 2025-06-24 VITALS
RESPIRATION RATE: 16 BRPM | WEIGHT: 270 LBS | DIASTOLIC BLOOD PRESSURE: 80 MMHG | SYSTOLIC BLOOD PRESSURE: 132 MMHG | HEART RATE: 86 BPM | BODY MASS INDEX: 35.78 KG/M2 | HEIGHT: 73 IN

## 2025-06-24 PROCEDURE — 99213 OFFICE O/P EST LOW 20 MIN: CPT | Mod: 25

## 2025-06-24 PROCEDURE — 46942 TREATMENT OF ANAL FISSURE: CPT

## 2025-06-25 PROBLEM — E78.5 HYPERLIPIDEMIA, UNSPECIFIED: Chronic | Status: ACTIVE | Noted: 2025-06-19

## 2025-06-25 PROBLEM — J45.909 UNSPECIFIED ASTHMA, UNCOMPLICATED: Chronic | Status: ACTIVE | Noted: 2025-06-19

## 2025-06-25 PROBLEM — G47.30 SLEEP APNEA, UNSPECIFIED: Chronic | Status: ACTIVE | Noted: 2025-06-19

## 2025-06-25 PROBLEM — Z86.79 PERSONAL HISTORY OF OTHER DISEASES OF THE CIRCULATORY SYSTEM: Chronic | Status: ACTIVE | Noted: 2025-06-19

## 2025-06-25 PROBLEM — I77.89 OTHER SPECIFIED DISORDERS OF ARTERIES AND ARTERIOLES: Chronic | Status: ACTIVE | Noted: 2025-06-19

## 2025-06-25 PROBLEM — I87.2 VENOUS INSUFFICIENCY (CHRONIC) (PERIPHERAL): Chronic | Status: ACTIVE | Noted: 2025-06-19

## 2025-06-27 RX ORDER — OXYCODONE HYDROCHLORIDE 30 MG/1
5 TABLET ORAL EVERY 4 HOURS
Refills: 0 | Status: DISCONTINUED | OUTPATIENT
Start: 2025-06-30 | End: 2025-07-01

## 2025-06-27 RX ORDER — DIAZEPAM 2 MG/1
5 TABLET ORAL ONCE
Refills: 0 | Status: DISCONTINUED | OUTPATIENT
Start: 2025-06-30 | End: 2025-06-30

## 2025-06-27 RX ORDER — ONDANSETRON HCL/PF 4 MG/2 ML
4 VIAL (ML) INJECTION EVERY 6 HOURS
Refills: 0 | Status: DISCONTINUED | OUTPATIENT
Start: 2025-06-30 | End: 2025-07-01

## 2025-06-27 RX ORDER — ENOXAPARIN SODIUM 100 MG/ML
40 INJECTION SUBCUTANEOUS EVERY 12 HOURS
Refills: 0 | Status: DISCONTINUED | OUTPATIENT
Start: 2025-06-30 | End: 2025-07-01

## 2025-06-27 RX ORDER — ACETAMINOPHEN 500 MG/5ML
1000 LIQUID (ML) ORAL ONCE
Refills: 0 | Status: DISCONTINUED | OUTPATIENT
Start: 2025-06-30 | End: 2025-07-01

## 2025-06-27 RX ORDER — SODIUM CHLORIDE 9 G/1000ML
1000 INJECTION, SOLUTION INTRAVENOUS
Refills: 0 | Status: DISCONTINUED | OUTPATIENT
Start: 2025-06-30 | End: 2025-07-01

## 2025-06-27 RX ORDER — KETOROLAC TROMETHAMINE 30 MG/ML
30 INJECTION, SOLUTION INTRAMUSCULAR; INTRAVENOUS EVERY 6 HOURS
Refills: 0 | Status: DISCONTINUED | OUTPATIENT
Start: 2025-06-30 | End: 2025-07-01

## 2025-06-30 ENCOUNTER — OUTPATIENT (OUTPATIENT)
Dept: INPATIENT UNIT | Facility: HOSPITAL | Age: 55
LOS: 1 days | Discharge: HOME | End: 2025-06-30
Payer: COMMERCIAL

## 2025-06-30 VITALS
RESPIRATION RATE: 16 BRPM | TEMPERATURE: 98 F | HEART RATE: 74 BPM | OXYGEN SATURATION: 99 % | WEIGHT: 263.89 LBS | SYSTOLIC BLOOD PRESSURE: 130 MMHG | DIASTOLIC BLOOD PRESSURE: 91 MMHG | HEIGHT: 73 IN

## 2025-06-30 DIAGNOSIS — Z98.890 OTHER SPECIFIED POSTPROCEDURAL STATES: Chronic | ICD-10-CM

## 2025-06-30 DIAGNOSIS — E66.01 MORBID (SEVERE) OBESITY DUE TO EXCESS CALORIES: ICD-10-CM

## 2025-06-30 LAB
ABO RH CONFIRMATION: SIGNIFICANT CHANGE UP
GLUCOSE BLDC GLUCOMTR-MCNC: 103 MG/DL — HIGH (ref 70–99)
GLUCOSE BLDC GLUCOMTR-MCNC: 124 MG/DL — HIGH (ref 70–99)
GLUCOSE BLDC GLUCOMTR-MCNC: 136 MG/DL — HIGH (ref 70–99)
GLUCOSE BLDC GLUCOMTR-MCNC: 138 MG/DL — HIGH (ref 70–99)

## 2025-06-30 PROCEDURE — 85025 COMPLETE CBC W/AUTO DIFF WBC: CPT

## 2025-06-30 PROCEDURE — 36415 COLL VENOUS BLD VENIPUNCTURE: CPT

## 2025-06-30 PROCEDURE — 99205 OFFICE O/P NEW HI 60 MIN: CPT

## 2025-06-30 PROCEDURE — C9399: CPT

## 2025-06-30 PROCEDURE — 88307 TISSUE EXAM BY PATHOLOGIST: CPT

## 2025-06-30 PROCEDURE — 88307 TISSUE EXAM BY PATHOLOGIST: CPT | Mod: 26

## 2025-06-30 PROCEDURE — 82962 GLUCOSE BLOOD TEST: CPT

## 2025-06-30 PROCEDURE — 80048 BASIC METABOLIC PNL TOTAL CA: CPT

## 2025-06-30 PROCEDURE — S2900: CPT

## 2025-06-30 PROCEDURE — C1889: CPT

## 2025-06-30 PROCEDURE — 43775 LAP SLEEVE GASTRECTOMY: CPT | Mod: AS

## 2025-06-30 RX ORDER — SILDENAFIL 50 MG/1
1 TABLET, FILM COATED ORAL
Refills: 0 | DISCHARGE

## 2025-06-30 RX ORDER — SODIUM CHLORIDE 9 G/1000ML
1000 INJECTION, SOLUTION INTRAVENOUS
Refills: 0 | Status: DISCONTINUED | OUTPATIENT
Start: 2025-06-30 | End: 2025-06-30

## 2025-06-30 RX ORDER — APREPITANT 40 MG/1
80 CAPSULE ORAL ONCE
Refills: 0 | Status: COMPLETED | OUTPATIENT
Start: 2025-06-30 | End: 2025-06-30

## 2025-06-30 RX ORDER — HYDROMORPHONE/SOD CHLOR,ISO/PF 2 MG/10 ML
0.5 SYRINGE (ML) INJECTION
Refills: 0 | Status: DISCONTINUED | OUTPATIENT
Start: 2025-06-30 | End: 2025-06-30

## 2025-06-30 RX ORDER — BUPIVACAINE 13.3 MG/ML
20 INJECTION, SUSPENSION, LIPOSOMAL INFILTRATION ONCE
Refills: 0 | Status: DISCONTINUED | OUTPATIENT
Start: 2025-06-30 | End: 2025-07-01

## 2025-06-30 RX ORDER — MOMETASONE FUROATE 50 UG/1
1 SPRAY, METERED NASAL
Refills: 0 | DISCHARGE

## 2025-06-30 RX ORDER — FENTANYL CITRATE-0.9 % NACL/PF 100MCG/2ML
25 SYRINGE (ML) INTRAVENOUS
Refills: 0 | Status: DISCONTINUED | OUTPATIENT
Start: 2025-06-30 | End: 2025-06-30

## 2025-06-30 RX ORDER — ALBUTEROL SULFATE 2.5 MG/3ML
2 VIAL, NEBULIZER (ML) INHALATION
Refills: 0 | DISCHARGE

## 2025-06-30 RX ORDER — ALPRAZOLAM 0.5 MG
1 TABLET, EXTENDED RELEASE 24 HR ORAL
Refills: 0 | DISCHARGE

## 2025-06-30 RX ORDER — UBIDECARENONE 100 MG
1 CAPSULE ORAL
Refills: 0 | DISCHARGE

## 2025-06-30 RX ORDER — ONDANSETRON HCL/PF 4 MG/2 ML
4 VIAL (ML) INJECTION ONCE
Refills: 0 | Status: COMPLETED | OUTPATIENT
Start: 2025-06-30 | End: 2025-06-30

## 2025-06-30 RX ORDER — ACETAMINOPHEN 500 MG/5ML
1000 LIQUID (ML) ORAL ONCE
Refills: 0 | Status: COMPLETED | OUTPATIENT
Start: 2025-06-30 | End: 2025-06-30

## 2025-06-30 RX ORDER — ROSUVASTATIN CALCIUM 20 MG/1
1 TABLET, FILM COATED ORAL
Refills: 0 | DISCHARGE

## 2025-06-30 RX ORDER — HEPARIN SODIUM 1000 [USP'U]/ML
5000 INJECTION INTRAVENOUS; SUBCUTANEOUS ONCE
Refills: 0 | Status: COMPLETED | OUTPATIENT
Start: 2025-06-30 | End: 2025-06-30

## 2025-06-30 RX ADMIN — Medication 0.5 MILLIGRAM(S): at 09:35

## 2025-06-30 RX ADMIN — Medication 4 MILLIGRAM(S): at 17:07

## 2025-06-30 RX ADMIN — KETOROLAC TROMETHAMINE 30 MILLIGRAM(S): 30 INJECTION, SOLUTION INTRAMUSCULAR; INTRAVENOUS at 14:48

## 2025-06-30 RX ADMIN — Medication 0.5 MILLIGRAM(S): at 09:45

## 2025-06-30 RX ADMIN — HEPARIN SODIUM 5000 UNIT(S): 1000 INJECTION INTRAVENOUS; SUBCUTANEOUS at 06:40

## 2025-06-30 RX ADMIN — Medication 4 MILLIGRAM(S): at 11:46

## 2025-06-30 RX ADMIN — APREPITANT 80 MILLIGRAM(S): 40 CAPSULE ORAL at 06:40

## 2025-06-30 RX ADMIN — Medication 40 MILLIGRAM(S): at 09:42

## 2025-06-30 RX ADMIN — Medication 1000 MILLIGRAM(S): at 14:48

## 2025-06-30 RX ADMIN — Medication 0.5 MILLIGRAM(S): at 10:07

## 2025-06-30 RX ADMIN — Medication 4 MILLIGRAM(S): at 10:09

## 2025-06-30 RX ADMIN — OXYCODONE HYDROCHLORIDE 5 MILLIGRAM(S): 30 TABLET ORAL at 11:26

## 2025-06-30 RX ADMIN — OXYCODONE HYDROCHLORIDE 5 MILLIGRAM(S): 30 TABLET ORAL at 11:56

## 2025-06-30 RX ADMIN — SODIUM CHLORIDE 100 MILLILITER(S): 9 INJECTION, SOLUTION INTRAVENOUS at 09:40

## 2025-06-30 RX ADMIN — KETOROLAC TROMETHAMINE 30 MILLIGRAM(S): 30 INJECTION, SOLUTION INTRAMUSCULAR; INTRAVENOUS at 21:03

## 2025-06-30 RX ADMIN — Medication 400 MILLIGRAM(S): at 21:03

## 2025-06-30 RX ADMIN — DIAZEPAM 5 MILLIGRAM(S): 2 TABLET ORAL at 22:06

## 2025-06-30 RX ADMIN — Medication 400 MILLIGRAM(S): at 14:48

## 2025-06-30 RX ADMIN — Medication 0.5 MILLIGRAM(S): at 09:48

## 2025-06-30 RX ADMIN — Medication 1000 MILLIGRAM(S): at 21:03

## 2025-06-30 RX ADMIN — SODIUM CHLORIDE 150 MILLILITER(S): 9 INJECTION, SOLUTION INTRAVENOUS at 11:26

## 2025-06-30 RX ADMIN — ENOXAPARIN SODIUM 40 MILLIGRAM(S): 100 INJECTION SUBCUTANEOUS at 21:03

## 2025-06-30 NOTE — CONSULT NOTE ADULT - SUBJECTIVE AND OBJECTIVE BOX
PCP:  Dr. Mariano Hinkle    CHIEF COMPLAINT:  Morbid Obesity, 34.8    HISTORY OF THE PRESENT ILLNESS: This a 55 yo male with PMH:  enlarged aorta,  venous insuff, AISHWARYA, asthma, HLD, anxiety and obesity.  He states he tried and failed multiple diets, exercise and usual modalities for weight loss and has now opted for weight loss surgery.  He followed with Dr Dyson and plan was made for surgery today  Pt  is now S/P Gastric Sleeve.  Patient is seen on 2 north, sleepy but awakens easily, wife present, questions answered,  denies any CP or SOB. We are consulted for Medical management    PMH: as above  PSH:S/P foot surgery, left, S/P vein stripping, History of prior ablation treatment, S/P sclerotherapy of varicose veins, S/P colonoscopic polypectomy,   History of endoscopy  FAMILY HISTORY:  Family history of diabetes mellitus (DM) (Grandparent)  Family history of heart disease (Father, Mother)  Social History: former smoker, 1/2 ppd x 6 years, quit in 1996, +Liquor 1-2 drinks/2-4 times per month, denies rec drug use      Allergies:Vicodin (Nausea)    Intolerances: none    Home Medications:  Albuterol (Eqv-ProAir HFA) 90 mcg/inh inhalation aerosol: 2 puff(s) inhaled prn (30 Jun 2025 06:16)  CoQ10 100 mg oral capsule: 1 cap(s) orally once a day (30 Jun 2025 06:16)  Nasonex 50 mcg/inh nasal spray: 1 spray(s) in each nostril once a day (30 Jun 2025 06:16)  rosuvastatin 20 mg oral capsule: 1 cap(s) orally once a day (30 Jun 2025 06:16)  sildenafil 50 mg oral tablet: 1 tab(s) orally prn (30 Jun 2025 06:16)  Xanax 0.5 mg oral tablet: 1 tab(s) orally prn (30 Jun 2025 06:16)      REVIEW OF SYSTEMS:   All 10 systems reviewed in detailed and found to be negative with the exception of what has already been described above    MEDICATIONS  (STANDING):  acetaminophen   IVPB .. 1000 milliGRAM(s) IV Intermittent once  BUPivacaine liposome 1.3% Injectable (no eMAR) 20 milliLiter(s) Local Injection once  enoxaparin Injectable 40 milliGRAM(s) SubCutaneous every 12 hours  ketorolac   Injectable 30 milliGRAM(s) IV Push every 6 hours  lactated ringers. 1000 milliLiter(s) (150 mL/Hr) IV Continuous <Continuous>  pantoprazole  Injectable 40 milliGRAM(s) IV Push every 24 hours    MEDICATIONS  (PRN):  diazepam  Injectable 5 milliGRAM(s) IV Push once PRN Anxiety  ondansetron Injectable 4 milliGRAM(s) IV Push every 6 hours PRN Nausea and/or Vomiting  oxyCODONE    Solution 5 milliGRAM(s) Oral every 4 hours PRN Moderate Pain (4 - 6)      PHYSICAL EXAM:    GENERAL: Comfortable, no acute distress   HEAD:  Normocephalic, atraumatic  EYES: EOMI, PERRLA  HEENT: Moist mucous membranes  NECK: Supple, No JVD  NERVOUS SYSTEM:  Alert & Oriented X3, Motor Strength 5/5 B/L upper and lower extremities  CHEST/LUNG: Clear to auscultation bilaterally  HEART: Regular rate and rhythm  ABDOMEN: Soft, obese,  Nondistended, Bowel sounds minimal, lap sites c/d/i  GENITOURINARY: Voiding, no palpable bladder  EXTREMITIES:   No clubbing, cyanosis, or edema  MUSCULOSKELETAL- No muscle tenderness, no joint tenderness  SKIN-no rash      LABS: preop labs reviewed    IMPRESSION: This a 55 yo male with PMH:  enlarged aorta,  venous insuff, AISHWARYA, asthma, HLD, anxiety and obesity.  He states he tried and failed multiple diets, exercise and usual modalities for weight loss and has now opted for weight loss surgery.  He followed with Dr Dyson and plan was made for surgery today,  Pt  is now S/P Gastric Sleeve.  Patient is seen on 2 north, sleepy but awakens easily, wife present, questions answered,  denies any CP or SOB. We are consulted for Medical management    #Obesity BMI 34.8  # S/P gastric Sleeve  POD#0  pain control  IVF's  cl liqs per bariatric protocol  Monitor CBC/BMP  BGMs with SS  protonix IVP    # enlarged aorta  per chart reviewed: dilatation "mild"  recent echo on 6/9:  preserved ejection fraction, no clinically significant valvular heart disease, mild aortic dilatation  nuclear stress test WNL      # AISHWARYA, unable to dru CPAP, uses mouth piece  monitor O2 sats  supplement with O2 NC prn    #VTE prophylaxis  Lovenox  Trace VTE: 1  lovenox wyile inpt  venodynes BLE  AMB    Thank you for the consult, will follow with you

## 2025-06-30 NOTE — CONSULT NOTE ADULT - NS ATTEND AMEND GEN_ALL_CORE FT
Patient was seen and examined by me at bedside with NP, Tati Kitchen.  I personally had a face-to-face  encounter with the patient. I was physically present for the key portions of the evaluation and management (E/M) service provided.  I agree with the above history, physical, and plan which I have reviewed and edited where appropriate      IMPRESSION: This a 55 yo male with PMH:  enlarged aorta,  venous insuff, AISHWARYA, asthma, HLD, anxiety and obesity.  He states he tried and failed multiple diets, exercise and usual modalities for weight loss and has now opted for weight loss surgery.  He followed with Dr Dyson and plan was made for surgery today,  Pt  is now S/P Gastric Sleeve.  Patient is seen on 2 north, sleepy but awakens easily, wife present, questions answered,  denies any CP or SOB. We are consulted for Medical management    #Obesity BMI 34.8  # S/P gastric Sleeve  POD#0  pain control  IVF's  cl liqs per bariatric protocol  Monitor CBC/BMP  BGMs with SS  protonix IVP    # enlarged aorta  per chart reviewed: dilatation "mild"  recent echo on 6/9:  preserved ejection fraction, no clinically significant valvular heart disease, mild aortic dilatation  nuclear stress test WNL    #VTE prophylaxis  Lovenox  Trace VTE: 1  lovenox wyile inpt  venodynes BLE  AMB    Thank you for the consult, will follow with you

## 2025-06-30 NOTE — ASU PATIENT PROFILE, ADULT - FALL HARM RISK - UNIVERSAL INTERVENTIONS
Bed in lowest position, wheels locked, appropriate side rails in place/Call bell, personal items and telephone in reach/Instruct patient to call for assistance before getting out of bed or chair/Non-slip footwear when patient is out of bed/Lawn to call system/Physically safe environment - no spills, clutter or unnecessary equipment/Purposeful Proactive Rounding/Room/bathroom lighting operational, light cord in reach

## 2025-06-30 NOTE — CHART NOTE - NSCHARTNOTEFT_GEN_A_CORE
Surgery date: 6-30-25    Pre-Operative Diagnosis: Refractory morbid obesity with multiple comorbid conditions.    Post-operative Diagnosis: Same    Primary Procedure  [01937] Robot Assisted Lap Longitudinal Gastrectomy     Secondary Procedure(s)  [07894] Bilateral TAP Block      Surgeon(s)  Surgeon:  Da Dyson MD  Assistant surgeon: Jorgito Xiong MD    Anesthesia type: General Anesthesia     Specimens:  partial gastrectomy sent to pathology     Estimated blood loss: 30 ml     DRAINS: NONE    COMPLICATIONS: NONE     INDICATIONS OF THE PROCEDURE: The patient is a 54-year-old male who is morbidly obese with a body mass index of 38. The patient has multiple comorbidities due to his morbid obesity including CAD and hyperlipidemia. The patient has failed multiple nonoperative attempts at weight loss. The patient meets NIH criteria for bariatric surgery and underwent appropriate preoperative workup, education, and signed the consent form freely. The patient had risks and benefits explained including, but not limited to, bleeding, leak, infection, disability, injury to transient structures, aspiration, DVT, pulmonary embolism, and death. The patient understood and agreed to proceed with surgery.     DESCRIPTION OF PROCEDURE: The patient was identified properly via a time-out. The patient was brought into the operating room and was placed on the operating room table in supine position. The patient was then given general endotracheal anesthesia and was given preoperative antibiotics. Preoperative heparin was given in the ambulatory surgery unit. The patient was then prepped and draped in the usual sterile fashion. An Exparel mixture was mixed at the back table with 20 mL of Exparel, 30 mL of 0.25% Marcaine and 150 mL of normal saline.  A Veress needle was placed in the left upper quadrant. The abdomen was insufflated to 15 millimeters of mercury. Once the abdomen was insufflated, . An incision was made within the umbilicus and an 8-millimeter robotic trocar was placed in the abdomen.  The laparoscope was inserted and there was no injury on initial trocar placement or initial Veress needle placement.   A Transversus Abdominus Plane Block was then conducted by placing 20 mL of the Exparel mixture preperitoneal at the distribution of the spinal nerves on the lateral abdominal wall.  This was started at the costal margin at the mid axillary line.  20cc of the Exparel mixture was placed in this area.  Another 20 mL was placed four centimeters caudal to this area.  Another 20 mL was placed four centimeters caudal to this area and another 15 mL was placed four centimeters caudal to this area.  This was repeated on the opposite side of the body in a similar fashion.  The rest of the Exparel was placed into the subcutaneous tissues and preperitoneal at every trocar site. A 12-millimeter robotic trocar was placed in the right upper quadrant. An 8 millimeter robotic trochar was placed in the left upper quadrant.  All of the trochars were placed at the same level on the abdomen.  An incision was made in subxiphoid area and a liver retractor was placed to hold the liver anteriorly and superiorly and was held in place using Mediflex device. The patient was then placed into steep reverse Trendelenburg position and a point along the stomach was then measured approximately 5 centimeters proximal to the pylorus and was marked with a marking pen.  At this point to the robot was docked to the robotic trochars.  2 graspers were used to retract the stomach.  The greater curvature was taken down all the way to the angle of His and the stomach was removed from the left crura using the robotic vessel sealer. At this point, the stomach was completely mobilized. A 40-[place] Endolumik bougie was then placed into the stomach and placed into the antrum. At this point a 60 millimeter robotic stapler with a blue load was used to start transecting the stomach along the bougie, one more blue fire was used.  Four more white loads were used to staple the stomach to wrap around the bougie to make a nice sleeve around the bougie.  The bougie was then removed.  Two sutures, 3-0 sutures were placed in the antrum of the stomach to the omental fat so that the sleeve would not rotate.  The robot at this point was then undocked.  Hemostasis was achieved, the remaining stomach was placed under saline solution and an the endolumik bougie was inflated with air to test the staple line. There was no leak from the staple line. There was no bleeding or stricture at the staple line. Complete hemostasis was assured.  The stomach specimen was placed into a 15mm Endo catch bag and removed from the abdomen.  The 12-millimeter trocar was then removed and the fascia was closed with a 0 Vicryl suture using the suture passer. Upon completion of the procedure, the abdomen was desufflated and all trocars were removed. The skin was closed with 4-0 Monocryl running subcuticular sutures. The patient tolerated the procedure well.    Disposition  To recovery room, VS stable.

## 2025-07-01 ENCOUNTER — TRANSCRIPTION ENCOUNTER (OUTPATIENT)
Age: 55
End: 2025-07-01

## 2025-07-01 VITALS
RESPIRATION RATE: 18 BRPM | DIASTOLIC BLOOD PRESSURE: 78 MMHG | SYSTOLIC BLOOD PRESSURE: 113 MMHG | TEMPERATURE: 98 F | OXYGEN SATURATION: 95 % | HEART RATE: 67 BPM

## 2025-07-01 DIAGNOSIS — E66.01 MORBID (SEVERE) OBESITY DUE TO EXCESS CALORIES: ICD-10-CM

## 2025-07-01 LAB
ANION GAP SERPL CALC-SCNC: 8 MMOL/L — SIGNIFICANT CHANGE UP (ref 5–17)
BASOPHILS # BLD AUTO: 0.01 K/UL — SIGNIFICANT CHANGE UP (ref 0–0.2)
BASOPHILS NFR BLD AUTO: 0.1 % — SIGNIFICANT CHANGE UP (ref 0–2)
BUN SERPL-MCNC: 6 MG/DL — LOW (ref 7–23)
CALCIUM SERPL-MCNC: 9.7 MG/DL — SIGNIFICANT CHANGE UP (ref 8.5–10.1)
CHLORIDE SERPL-SCNC: 104 MMOL/L — SIGNIFICANT CHANGE UP (ref 96–108)
CO2 SERPL-SCNC: 26 MMOL/L — SIGNIFICANT CHANGE UP (ref 22–31)
CREAT SERPL-MCNC: 0.69 MG/DL — SIGNIFICANT CHANGE UP (ref 0.5–1.3)
EGFR: 111 ML/MIN/1.73M2 — SIGNIFICANT CHANGE UP
EGFR: 111 ML/MIN/1.73M2 — SIGNIFICANT CHANGE UP
EOSINOPHIL # BLD AUTO: 0 K/UL — SIGNIFICANT CHANGE UP (ref 0–0.5)
EOSINOPHIL NFR BLD AUTO: 0 % — SIGNIFICANT CHANGE UP (ref 0–6)
GLUCOSE BLDC GLUCOMTR-MCNC: 130 MG/DL — HIGH (ref 70–99)
GLUCOSE SERPL-MCNC: 108 MG/DL — HIGH (ref 70–99)
HCT VFR BLD CALC: 40.8 % — SIGNIFICANT CHANGE UP (ref 39–50)
HGB BLD-MCNC: 13.6 G/DL — SIGNIFICANT CHANGE UP (ref 13–17)
IMM GRANULOCYTES # BLD AUTO: 0.04 K/UL — SIGNIFICANT CHANGE UP (ref 0–0.07)
IMM GRANULOCYTES NFR BLD AUTO: 0.4 % — SIGNIFICANT CHANGE UP (ref 0–0.9)
LYMPHOCYTES # BLD AUTO: 1.42 K/UL — SIGNIFICANT CHANGE UP (ref 1–3.3)
LYMPHOCYTES NFR BLD AUTO: 12.7 % — LOW (ref 13–44)
MCHC RBC-ENTMCNC: 30.5 PG — SIGNIFICANT CHANGE UP (ref 27–34)
MCHC RBC-ENTMCNC: 33.3 G/DL — SIGNIFICANT CHANGE UP (ref 32–36)
MCV RBC AUTO: 91.5 FL — SIGNIFICANT CHANGE UP (ref 80–100)
MONOCYTES # BLD AUTO: 0.95 K/UL — HIGH (ref 0–0.9)
MONOCYTES NFR BLD AUTO: 8.5 % — SIGNIFICANT CHANGE UP (ref 2–14)
NEUTROPHILS # BLD AUTO: 8.79 K/UL — HIGH (ref 1.8–7.4)
NEUTROPHILS NFR BLD AUTO: 78.3 % — HIGH (ref 43–77)
NRBC # BLD AUTO: 0 K/UL — SIGNIFICANT CHANGE UP (ref 0–0)
NRBC # FLD: 0 K/UL — SIGNIFICANT CHANGE UP (ref 0–0)
NRBC BLD AUTO-RTO: 0 /100 WBCS — SIGNIFICANT CHANGE UP (ref 0–0)
PLATELET # BLD AUTO: 183 K/UL — SIGNIFICANT CHANGE UP (ref 150–400)
PMV BLD: 10.1 FL — SIGNIFICANT CHANGE UP (ref 7–13)
POTASSIUM SERPL-MCNC: 4 MMOL/L — SIGNIFICANT CHANGE UP (ref 3.5–5.3)
POTASSIUM SERPL-SCNC: 4 MMOL/L — SIGNIFICANT CHANGE UP (ref 3.5–5.3)
RBC # BLD: 4.46 M/UL — SIGNIFICANT CHANGE UP (ref 4.2–5.8)
RBC # FLD: 13 % — SIGNIFICANT CHANGE UP (ref 10.3–14.5)
SODIUM SERPL-SCNC: 138 MMOL/L — SIGNIFICANT CHANGE UP (ref 135–145)
WBC # BLD: 11.21 K/UL — HIGH (ref 3.8–10.5)
WBC # FLD AUTO: 11.21 K/UL — HIGH (ref 3.8–10.5)

## 2025-07-01 PROCEDURE — 99232 SBSQ HOSP IP/OBS MODERATE 35: CPT

## 2025-07-01 RX ORDER — ACETAMINOPHEN 500 MG/5ML
1000 LIQUID (ML) ORAL ONCE
Refills: 0 | Status: COMPLETED | OUTPATIENT
Start: 2025-07-01 | End: 2025-07-01

## 2025-07-01 RX ADMIN — SODIUM CHLORIDE 150 MILLILITER(S): 9 INJECTION, SOLUTION INTRAVENOUS at 03:06

## 2025-07-01 RX ADMIN — Medication 400 MILLIGRAM(S): at 09:17

## 2025-07-01 RX ADMIN — Medication 400 MILLIGRAM(S): at 03:06

## 2025-07-01 RX ADMIN — KETOROLAC TROMETHAMINE 30 MILLIGRAM(S): 30 INJECTION, SOLUTION INTRAMUSCULAR; INTRAVENOUS at 03:06

## 2025-07-01 RX ADMIN — Medication 40 MILLIGRAM(S): at 09:16

## 2025-07-01 RX ADMIN — Medication 1000 MILLIGRAM(S): at 09:17

## 2025-07-01 RX ADMIN — Medication 1000 MILLIGRAM(S): at 03:06

## 2025-07-01 RX ADMIN — Medication 4 MILLIGRAM(S): at 02:06

## 2025-07-01 RX ADMIN — SODIUM CHLORIDE 150 MILLILITER(S): 9 INJECTION, SOLUTION INTRAVENOUS at 09:10

## 2025-07-01 RX ADMIN — KETOROLAC TROMETHAMINE 30 MILLIGRAM(S): 30 INJECTION, SOLUTION INTRAMUSCULAR; INTRAVENOUS at 09:17

## 2025-07-01 RX ADMIN — KETOROLAC TROMETHAMINE 30 MILLIGRAM(S): 30 INJECTION, SOLUTION INTRAMUSCULAR; INTRAVENOUS at 09:16

## 2025-07-01 RX ADMIN — ENOXAPARIN SODIUM 40 MILLIGRAM(S): 100 INJECTION SUBCUTANEOUS at 09:16

## 2025-07-01 NOTE — DISCHARGE NOTE NURSING/CASE MANAGEMENT/SOCIAL WORK - PATIENT PORTAL LINK FT
You can access the FollowMyHealth Patient Portal offered by [facility] by registering at the following website: [email or web address]. By joining [email or web address]’s FollowMyHealth portal, you will also be able to view your health information using other applications (apps) compatible with our system. Discharged

## 2025-07-01 NOTE — DISCHARGE NOTE PROVIDER - NSDCCPCAREPLAN_GEN_ALL_CORE_FT
Scheduled 10/31/23 for Otitis media, unspecified, unspecified ear: Olvin pedraza. Patient was at office today, \"Had a blow out in car, poop everywhere. \" Mom attempted to call to let office know she is in parking lot cleaning up daughter to bring her in to check in. \"  Mom states was not able to check in. Mom wanting to be seen today. Please advise mom in parking lot [phone]. PRINCIPAL DISCHARGE DIAGNOSIS  Diagnosis: Morbid obesity  Assessment and Plan of Treatment:

## 2025-07-01 NOTE — DISCHARGE NOTE PROVIDER - HOSPITAL COURSE
Patient is a 53 yo M that underwent laparoscopic robotic assisted sleeve gastrectomy without any complications. Patient is currently doing very well, pain controlled, and is tolerating phase I bariatric diet. Patient has had uncomplicated hospital course and is stable for discharge home with follow-up in the office in 2 weeks.

## 2025-07-01 NOTE — PROGRESS NOTE ADULT - SUBJECTIVE AND OBJECTIVE BOX
Post Op Day#: 1  Procedure:  Laparoscopic robotic assisted Sleeve Gastrectomy    The patient is doing well without complaints.    Vital Signs Last 24 Hrs  T(C): 36.6 (01 Jul 2025 08:00), Max: 36.7 (30 Jun 2025 11:00)  T(F): 97.9 (01 Jul 2025 08:00), Max: 98.1 (30 Jun 2025 11:00)  HR: 67 (01 Jul 2025 08:00) (55 - 68)  BP: 113/78 (01 Jul 2025 08:00) (106/65 - 129/81)  BP(mean): --  RR: 18 (01 Jul 2025 08:00) (16 - 18)  SpO2: 95% (01 Jul 2025 08:00) (95% - 100%)    Parameters below as of 01 Jul 2025 08:00  Patient On (Oxygen Delivery Method): room air        PHYSICAL EXAM:  General: NAD.  HEENT: no JVD, no jaundice.  LUNGS: CTAB.  Heart: S1 S2 RRR  Abd: soft nt/nd   Wounds: clean dry and intact                          13.6   11.21 )-----------( 183      ( 01 Jul 2025 07:34 )             40.8       07-01    138  |  104  |  6[L]  ----------------------------<  108[H]  4.0   |  26  |  0.69    Ca    9.7      01 Jul 2025 07:34          
  PCP:  Dr. Mariano Hinkle    CHIEF COMPLAINT:  Morbid Obesity, 34.8    HISTORY OF THE PRESENT ILLNESS: This a 53 yo male with PMH:  enlarged aorta,  venous insuff, AISHWARYA, asthma, HLD, anxiety and obesity.  He states he tried and failed multiple diets, exercise and usual modalities for weight loss and has now opted for weight loss surgery.  He followed with Dr Dyson and  is now S/P Gastric Sleeve.   We are consulted for Medical management      pt seen walking in hallway with wife present, states the trace clears were given him GERD/reflux, changed to plain water at the prescribed amt, dru that better, denies any cp or sob, no dizziness or lightheadedness when ambulating, + voiding    REVIEW OF SYSTEMS:   All 10 systems reviewed in detailed and found to be negative with the exception of what has already been described above    MEDICATIONS  (STANDING):  acetaminophen   IVPB .. 1000 milliGRAM(s) IV Intermittent once  BUPivacaine liposome 1.3% Injectable (no eMAR) 20 milliLiter(s) Local Injection once  enoxaparin Injectable 40 milliGRAM(s) SubCutaneous every 12 hours  ketorolac   Injectable 30 milliGRAM(s) IV Push every 6 hours  lactated ringers. 1000 milliLiter(s) (150 mL/Hr) IV Continuous <Continuous>  pantoprazole  Injectable 40 milliGRAM(s) IV Push every 24 hours    MEDICATIONS  (PRN):  ondansetron Injectable 4 milliGRAM(s) IV Push every 6 hours PRN Nausea and/or Vomiting  oxyCODONE    Solution 5 milliGRAM(s) Oral every 4 hours PRN Moderate Pain (4 - 6)        PHYSICAL EXAM:    Vital Signs Last 24 Hrs  T(C): 36.6 (01 Jul 2025 08:00), Max: 36.6 (01 Jul 2025 08:00)  T(F): 97.9 (01 Jul 2025 08:00), Max: 97.9 (01 Jul 2025 08:00)  HR: 67 (01 Jul 2025 08:00) (55 - 67)  BP: 113/78 (01 Jul 2025 08:00) (110/72 - 116/77)  BP(mean): --  RR: 18 (01 Jul 2025 08:00) (16 - 18)  SpO2: 95% (01 Jul 2025 08:00) (95% - 100%)    Parameters below as of 01 Jul 2025 08:00  Patient On (Oxygen Delivery Method): room air        GENERAL: Comfortable, no acute distress   HEAD:  Normocephalic, atraumatic  EYES: EOMI, PERRLA  HEENT: Moist mucous membranes  NECK: Supple, No JVD  NERVOUS SYSTEM:  Alert & Oriented X3, Motor Strength 5/5 B/L upper and lower extremities  CHEST/LUNG: Clear to auscultation bilaterally  HEART: Regular rate and rhythm  ABDOMEN: Soft, obese,  Nondistended, Bowel sounds minimal, lap sites c/d/i  GENITOURINARY: Voiding, no palpable bladder  EXTREMITIES:   No clubbing, cyanosis, or edema  MUSCULOSKELETAL- No muscle tenderness, no joint tenderness  SKIN-no rash      LABS:                       13.6   11.21 )-----------( 183      ( 01 Jul 2025 07:34 )             40.8       07-01    138  |  104  |  6[L]  ----------------------------<  108[H]  4.0   |  26  |  0.69    Ca    9.7      01 Jul 2025 07:34            IMPRESSION: This a 53 yo male with PMH:  enlarged aorta,  venous insuff, AISHWARYA, asthma, HLD, anxiety and obesity.  He states he tried and failed multiple diets, exercise and usual modalities for weight loss and has now opted for weight loss surgery.  He followed with Dr Dyson and plan was made for surgery today,  Pt  is now S/P Gastric Sleeve.  Patient is seen on 2 [place], sleepy but awakens easily, wife present, questions answered,  denies any CP or SOB. We are consulted for Medical management    #Obesity BMI 34.8  # S/P gastric Sleeve  POD#1  pain control  IVF's  cl liqs per bariatric protocol  Monitor CBC/BMP  BGMs with SS  protonix IVP    # leukocytosis  likely reactive, afebrile  no s/s of infn    # enlarged aorta  per chart reviewed: dilatation "mild"  recent echo on 6/9:  preserved ejection fraction, no clinically significant valvular heart disease, mild aortic dilatation  nuclear stress test WNL      # AISHWARYA, unable to dru CPAP, uses mouth piece  monitor O2 sats  supplement with O2 NC prn    #VTE prophylaxis  Lovenox  Trace VTE: 1  lovenox while inpt  venodynes BLE  AMB  dispo: home today

## 2025-07-01 NOTE — DISCHARGE NOTE PROVIDER - CARE PROVIDER_API CALL
Da Dyson  Surgery (General Surgery)  [address], [address]  [place] [address]  Phone: [phone]  Fax: [phone]  Follow Up Time:

## 2025-07-01 NOTE — DISCHARGE NOTE PROVIDER - NSDCMRMEDTOKEN_GEN_ALL_CORE_FT
Albuterol (Eqv-ProAir HFA) 90 mcg/inh inhalation aerosol: 2 puff(s) inhaled prn  CoQ10 100 mg oral capsule: 1 cap(s) orally once a day  Nasonex 50 mcg/inh nasal spray: 1 spray(s) in each nostril once a day  rosuvastatin 20 mg oral capsule: 1 cap(s) orally once a day  sildenafil 50 mg oral tablet: 1 tab(s) orally prn  Xanax 0.5 mg oral tablet: 1 tab(s) orally prn

## 2025-07-01 NOTE — PROGRESS NOTE ADULT - PROBLEM SELECTOR PLAN 1
The patient is s/p Laparoscopic robotic assisted sleeve gastrectomy and doing very well.  All discharge instructions were given to the patient, as well as potential signs of complications.  The patient will follow up in 2 weeks.  [facility]

## 2025-07-03 LAB — SURGICAL PATHOLOGY STUDY: SIGNIFICANT CHANGE UP

## 2025-07-08 DIAGNOSIS — F41.9 ANXIETY DISORDER, UNSPECIFIED: ICD-10-CM

## 2025-07-08 DIAGNOSIS — K31.89 OTHER DISEASES OF STOMACH AND DUODENUM: ICD-10-CM

## 2025-07-08 DIAGNOSIS — J45.909 UNSPECIFIED ASTHMA, UNCOMPLICATED: ICD-10-CM

## 2025-07-08 DIAGNOSIS — E66.01 MORBID (SEVERE) OBESITY DUE TO EXCESS CALORIES: ICD-10-CM

## 2025-07-08 DIAGNOSIS — I25.10 ATHEROSCLEROTIC HEART DISEASE OF NATIVE CORONARY ARTERY WITHOUT ANGINA PECTORIS: ICD-10-CM

## 2025-07-08 DIAGNOSIS — E78.00 PURE HYPERCHOLESTEROLEMIA, UNSPECIFIED: ICD-10-CM

## 2025-07-08 DIAGNOSIS — G47.33 OBSTRUCTIVE SLEEP APNEA (ADULT) (PEDIATRIC): ICD-10-CM

## 2025-07-08 DIAGNOSIS — Z87.891 PERSONAL HISTORY OF NICOTINE DEPENDENCE: ICD-10-CM

## 2025-09-09 ENCOUNTER — APPOINTMENT (OUTPATIENT)
Facility: CLINIC | Age: 55
End: 2025-09-09